# Patient Record
Sex: FEMALE | Race: WHITE | Employment: OTHER | URBAN - METROPOLITAN AREA
[De-identification: names, ages, dates, MRNs, and addresses within clinical notes are randomized per-mention and may not be internally consistent; named-entity substitution may affect disease eponyms.]

---

## 2017-11-06 PROBLEM — E11.9 CONTROLLED TYPE 2 DIABETES MELLITUS WITHOUT COMPLICATION, WITHOUT LONG-TERM CURRENT USE OF INSULIN (HCC): Status: ACTIVE | Noted: 2017-11-06

## 2017-11-06 PROCEDURE — 82043 UR ALBUMIN QUANTITATIVE: CPT | Performed by: FAMILY MEDICINE

## 2017-11-06 PROCEDURE — 82570 ASSAY OF URINE CREATININE: CPT | Performed by: FAMILY MEDICINE

## 2017-11-06 PROCEDURE — 36415 COLL VENOUS BLD VENIPUNCTURE: CPT | Performed by: FAMILY MEDICINE

## 2019-01-03 PROCEDURE — 82043 UR ALBUMIN QUANTITATIVE: CPT | Performed by: FAMILY MEDICINE

## 2019-01-03 PROCEDURE — 82570 ASSAY OF URINE CREATININE: CPT | Performed by: FAMILY MEDICINE

## 2020-10-26 PROBLEM — C96.A: Status: ACTIVE | Noted: 2017-06-06

## 2020-10-26 PROBLEM — M85.80 OSTEOPENIA DETERMINED BY X-RAY: Status: ACTIVE | Noted: 2020-10-26

## 2021-04-11 DIAGNOSIS — Z23 NEED FOR VACCINATION: ICD-10-CM

## 2021-08-18 ENCOUNTER — HOSPITAL ENCOUNTER (OUTPATIENT)
Age: 71
Discharge: HOME | End: 2021-08-18
Attending: EMERGENCY MEDICINE
Payer: MEDICARE

## 2021-08-18 ENCOUNTER — APPOINTMENT (OUTPATIENT)
Dept: GENERAL RADIOLOGY | Age: 71
End: 2021-08-18
Attending: EMERGENCY MEDICINE
Payer: MEDICARE

## 2021-08-18 VITALS
SYSTOLIC BLOOD PRESSURE: 141 MMHG | HEART RATE: 81 BPM | DIASTOLIC BLOOD PRESSURE: 78 MMHG | TEMPERATURE: 98 F | OXYGEN SATURATION: 95 % | RESPIRATION RATE: 18 BRPM

## 2021-08-18 DIAGNOSIS — L03.011 CELLULITIS OF RIGHT MIDDLE FINGER: Primary | ICD-10-CM

## 2021-08-18 PROCEDURE — 99203 OFFICE O/P NEW LOW 30 MIN: CPT

## 2021-08-18 PROCEDURE — 73140 X-RAY EXAM OF FINGER(S): CPT | Performed by: EMERGENCY MEDICINE

## 2021-08-18 RX ORDER — CEPHALEXIN 500 MG/1
500 CAPSULE ORAL 3 TIMES DAILY
Qty: 30 CAPSULE | Refills: 0 | Status: SHIPPED | OUTPATIENT
Start: 2021-08-18 | End: 2021-08-28

## 2021-08-18 NOTE — ED INITIAL ASSESSMENT (HPI)
PATIENT ARRIVED AMBULATORY TO ROOM C/O REDNESS TO THE THIRD DIGIT ON THE RIGHT HAND. PATIENT STATES SHE NOTICED A SMALL CUT 3 DAYS AGO. INCREASING REDNESS. SMALL AMOUNT OF DRAINAGE TODAY.  NO FEVERS

## 2021-08-18 NOTE — ED PROVIDER NOTES
Patient Seen in: [facility]      History   Patient presents with:  Finger Pain    Stated Complaint: finger problem    HPI/Subjective:   HPI    The patient is a 56-year-old female with past history of hyperlipidemia, type 2 diabetes who presen No             Review of Systems    Positive for stated complaint: finger problem  Other systems are as noted in HPI. Constitutional and vital signs reviewed. All other systems reviewed and negative except as noted above.     Physical Exam     ED Tria Prescribed:  Current Discharge Medication List    START taking these medications    cephalexin 500 MG Oral Cap  Take 1 capsule (500 mg total) by mouth 3 (three) times daily for 10 days.   Qty: 30 capsule Refills: 0

## 2022-03-10 PROBLEM — J44.1 CHRONIC OBSTRUCTIVE PULMONARY DISEASE WITH ACUTE EXACERBATION (HCC): Status: ACTIVE | Noted: 2022-03-10

## 2023-06-17 ENCOUNTER — APPOINTMENT (OUTPATIENT)
Dept: GENERAL RADIOLOGY | Age: 73
End: 2023-06-17
Attending: PHYSICIAN ASSISTANT
Payer: MEDICARE

## 2023-06-17 ENCOUNTER — HOSPITAL ENCOUNTER (OUTPATIENT)
Age: 73
Discharge: HOME | End: 2023-06-17
Payer: MEDICARE

## 2023-06-17 VITALS
DIASTOLIC BLOOD PRESSURE: 84 MMHG | SYSTOLIC BLOOD PRESSURE: 130 MMHG | OXYGEN SATURATION: 94 % | TEMPERATURE: 98 F | HEART RATE: 96 BPM | RESPIRATION RATE: 18 BRPM

## 2023-06-17 DIAGNOSIS — S92.902A CLOSED FRACTURE OF LEFT FOOT, INITIAL ENCOUNTER: Primary | ICD-10-CM

## 2023-06-17 PROCEDURE — 28470 CLTX METATARSAL FX WO MNP EA: CPT

## 2023-06-17 PROCEDURE — 73630 X-RAY EXAM OF FOOT: CPT | Performed by: PHYSICIAN ASSISTANT

## 2023-06-17 PROCEDURE — 99213 OFFICE O/P EST LOW 20 MIN: CPT

## 2023-06-17 PROCEDURE — 99214 OFFICE O/P EST MOD 30 MIN: CPT

## 2024-02-01 ENCOUNTER — OFFICE VISIT (OUTPATIENT)
Dept: OTOLARYNGOLOGY | Facility: CLINIC | Age: 74
End: 2024-02-01
Payer: MEDICARE

## 2024-02-01 VITALS — BODY MASS INDEX: 23.22 KG/M2 | WEIGHT: 136 LBS | HEIGHT: 64 IN

## 2024-02-01 DIAGNOSIS — H61.21 IMPACTED CERUMEN OF RIGHT EAR: Primary | ICD-10-CM

## 2024-02-01 DIAGNOSIS — H91.93 BILATERAL HEARING LOSS, UNSPECIFIED HEARING LOSS TYPE: ICD-10-CM

## 2024-02-01 RX ORDER — NEOMYCIN SULFATE, POLYMYXIN B SULFATE AND HYDROCORTISONE 10; 3.5; 1 MG/ML; MG/ML; [USP'U]/ML
3 SUSPENSION/ DROPS AURICULAR (OTIC) 3 TIMES DAILY
Qty: 10 ML | Refills: 0 | Status: SHIPPED | OUTPATIENT
Start: 2024-02-01

## 2024-02-02 NOTE — PROGRESS NOTES
Sheba Rice is a 73 year old female.    Chief Complaint   Patient presents with    Ear Problem     Both ears clogged for multiple weeks  Pt reports feeling like she's under water       HISTORY OF PRESENT ILLNESS  She presents with a several week history of feeling that her ears are clogged bilaterally.  She feels like she is underwater.  Denies any recent cold or flulike symptoms.  Denies any congestive issues intranasally.  No other signs, symptoms or complaints      Social History     Socioeconomic History    Marital status:    Tobacco Use    Smoking status: Former     Packs/day: 1.00     Years: 40.00     Additional pack years: 0.00     Total pack years: 40.00     Types: Cigarettes     Quit date: 2006     Years since quittin.0    Smokeless tobacco: Never    Tobacco comments:     relapsed 1 ppd--    Substance and Sexual Activity    Alcohol use: No    Drug use: No       Family History   Problem Relation Age of Onset    Heart Disorder Father         MI    Lipids Sister     Other (COPD) Sister     Diabetes Brother         pre-dm    Other (ETOH ABUSE) Brother     Lipids Sister     Other (RA) Sister     Heart Disorder Mother         a fib  -         Past Medical History:   Diagnosis Date    CANCER     R fallopian tube Ca--chemo/rad, on trial med postarted  for stable disease-- LN + L clavicle    Esophageal reflux     Leg cramps, sleep related     Other and unspecified hyperlipidemia     SBO (small bowel obstruction) (HCC) 3/12    no surgery-- improved after barium    Thyroid nodule     right    Type II or unspecified type diabetes mellitus without mention of complication, not stated as uncontrolled        Past Surgical History:   Procedure Laterality Date    BACK SURGERY      microdisckectomy--c foot drop    COLONOSCOPY      + polyps-repeat 5 yrs    D & C      HYSTERECTOMY  08    TAHBSO for fallopian ca    HYSTERECTOMY  08    staging c lymph node dissection     OTHER SURGICAL HISTORY  1996    CTS release    OTHER SURGICAL HISTORY  1990    ORIF fx L hand fx--removal '91    OTHER SURGICAL HISTORY  3/16    neck dissection for mets to neck LN of fallopian tube ca    TONSILLECTOMY           REVIEW OF SYSTEMS    System Neg/Pos Details   Constitutional Negative Fatigue, fever and weight loss.   ENMT Negative Drooling.   Eyes Negative Blurred vision and vision changes.   Respiratory Negative Dyspnea and wheezing.   Cardio Negative Chest pain, irregular heartbeat/palpitations and syncope.   GI Negative Abdominal pain and diarrhea.   Endocrine Negative Cold intolerance and heat intolerance.   Neuro Negative Tremors.   Psych Negative Anxiety and depression.   Integumentary Negative Frequent skin infections, pigment change and rash.   Hema/Lymph Negative Easy bleeding and easy bruising.           PHYSICAL EXAM    Ht 5' 4\" (1.626 m)   Wt 136 lb (61.7 kg)   BMI 23.34 kg/m²        Constitutional Normal Overall appearance - Normal.   Psychiatric Normal Orientation - Oriented to time, place, person & situation. Appropriate mood and affect.   Neck Exam Normal Inspection - Normal. Palpation - Normal. Parotid gland - Normal. Thyroid gland - Normal.   Eyes Normal Conjunctiva - Right: Normal, Left: Normal. Pupil - Right: Normal, Left: Normal. Fundus - Right: Normal, Left: Normal.   Neurological Normal Memory - Normal. Cranial nerves - Cranial nerves II through XII grossly intact.   Head/Face Normal Facial features - Normal. Eyebrows - Normal. Skull - Normal.        Nasopharynx Normal External nose - Normal. Lips/teeth/gums - Normal. Tonsils - Normal. Oropharynx - Normal.   Ears Normal Inspection - Right: Normal, Left: Normal. Canal - Right: Normal, Left: Normal. TM - Right: Normal, Left: Normal.  Wax impaction on the right cleared 80%.   Skin Normal Inspection - Normal.        Lymph Detail Normal Submental. Submandibular. Anterior cervical. Posterior cervical. Supraclavicular.         Nose/Mouth/Throat Normal External nose - Normal. Lips/teeth/gums - Normal. Tonsils - Normal. Oropharynx - Normal.   Nose/Mouth/Throat Normal Nares - Right: Normal Left: Normal. Septum -Normal  Turbinates - Right: Normal, Left: Normal.   Microscopy  Binocular microscopy was performed. The affected ear(s) was/were examined and all debris removed using suction. The findings are described in the physical exam.   Right ear cleaned of roughly 80% of cerumen impaction.  Still with tympanic membrane wax.  Very uncomfortable attempting to remove it.  Left ear completely normal.    Current Outpatient Medications:     neomycin-polymyxin-hydrocortisone 3.5-19266-2 Otic Suspension, Place 3 drops into the right ear 3 (three) times daily., Disp: 10 mL, Rfl: 0    LEVOTHYROXINE 50 MCG Oral Tab, TAKE 1/2 TABLET(25 MCG) BY MOUTH BEFORE BREAKFAST, Disp: 45 tablet, Rfl: 0    Tiotropium Bromide Monohydrate 18 MCG Inhalation Cap, Inhale 1 capsule (18 mcg total) into the lungs daily., Disp: , Rfl: 0    ROSUVASTATIN 20 MG Oral Tab, TAKE 1 TABLET(20 MG) BY MOUTH DAILY, Disp: 90 tablet, Rfl: 0    JANUVIA 100 MG Oral Tab, TAKE 1 TABLET(100 MG) BY MOUTH DAILY, Disp: 90 tablet, Rfl: 0    Albuterol Sulfate  (90 Base) MCG/ACT Inhalation Aero Soln, Inhale 2 puffs into the lungs every 6 (six) hours as needed for Wheezing or Shortness of Breath., Disp: 18 g, Rfl: 0    famoTIDine 20 MG Oral Tab, Take 1 tablet (20 mg total) by mouth 2 (two) times daily., Disp: 180 tablet, Rfl: 3    PARoxetine HCl 10 MG Oral Tab, Take 1 tablet (10 mg total) by mouth every morning., Disp: 90 tablet, Rfl: 3    PARoxetine HCl 40 MG Oral Tab, Take 1 tablet (40 mg total) by mouth daily., Disp: 90 tablet, Rfl: 3    VITAMIN D 1000 UNIT OR CAPS, 1 TWICE DAILY, Disp: , Rfl:   ASSESSMENT AND PLAN    1. Impacted cerumen of right ear  Impacted cerumen of the right ear.  I was able to remove about 80% of the wax and there is a portion sitting on the eardrum that could not  remove due to discomfort.  Start Cortisporin eardrops return to see me in 1 week for reevaluation and subsequent to ear cleaning will require a hearing test.  I did reassure her that her left ear looks completely normal at this time.        This note was prepared using [employer] voice recognition dictation software. As a result errors may occur. When identified these errors have been corrected. While every attempt is made to correct errors during dictation discrepancies may still exist    Jorge A Palmer MD    2/1/2024    6:17 PM

## 2024-02-08 ENCOUNTER — OFFICE VISIT (OUTPATIENT)
Dept: OTOLARYNGOLOGY | Facility: CLINIC | Age: 74
End: 2024-02-08

## 2024-02-08 DIAGNOSIS — H61.21 IMPACTED CERUMEN OF RIGHT EAR: Primary | ICD-10-CM

## 2024-02-08 PROCEDURE — 69210 REMOVE IMPACTED EAR WAX UNI: CPT | Performed by: OTOLARYNGOLOGY

## 2024-02-08 NOTE — PROGRESS NOTES
Sheba Rice is a 73 year old female.    Chief Complaint   Patient presents with    Follow - Up     Ear cleaning        HISTORY OF PRESENT ILLNESS    Patient presents for cerumen removal. No other complaints or concerns at this time    Social History     Socioeconomic History    Marital status:    Tobacco Use    Smoking status: Former     Packs/day: 1.00     Years: 40.00     Additional pack years: 0.00     Total pack years: 40.00     Types: Cigarettes     Quit date: 2006     Years since quittin.0    Smokeless tobacco: Never    Tobacco comments:     relapsed 1 ppd--    Substance and Sexual Activity    Alcohol use: No    Drug use: No       Family History   Problem Relation Age of Onset    Heart Disorder Father         MI    Lipids Sister     Other (COPD) Sister     Diabetes Brother         pre-dm    Other (ETOH ABUSE) Brother     Lipids Sister     Other (RA) Sister     Heart Disorder Mother         a fib  -         Past Medical History:   Diagnosis Date    CANCER     R fallopian tube Ca--chemo/rad, on trial med postarted  for stable disease-- LN + L clavicle    Esophageal reflux     Leg cramps, sleep related     Other and unspecified hyperlipidemia     SBO (small bowel obstruction) (HCC) 3/12    no surgery-- improved after barium    Thyroid nodule     right    Type II or unspecified type diabetes mellitus without mention of complication, not stated as uncontrolled        Past Surgical History:   Procedure Laterality Date    BACK SURGERY      microdisckectomy--c foot drop    COLONOSCOPY      + polyps-repeat 5 yrs    D & C      HYSTERECTOMY  08    TAHBSO for fallopian ca    HYSTERECTOMY  08    staging c lymph node dissection    OTHER SURGICAL HISTORY      CTS release    OTHER SURGICAL HISTORY      ORIF fx L hand fx--removal     OTHER SURGICAL HISTORY  3/16    neck dissection for mets to neck LN of fallopian tube ca    TONSILLECTOMY         REVIEW OF  SYSTEMS    System Neg/Pos Details   Constitutional Negative Fatigue, fever and weight loss.   ENMT Negative Drooling.   Eyes Negative Blurred vision and vision changes.   Respiratory Negative Dyspnea and wheezing.   Cardio Negative Chest pain, irregular heartbeat/palpitations and syncope.   GI Negative Abdominal pain and diarrhea.   Endocrine Negative Cold intolerance and heat intolerance.   Neuro Negative Tremors.   Psych Negative Anxiety and depression.   Integumentary Negative Frequent skin infections, pigment change and rash.   Hema/Lymph Negative Easy bleeding and easy bruising.           PHYSICAL EXAM    There were no vitals taken for this visit.       Constitutional Normal Overall appearance - Normal.        Neck Exam Normal Inspection - Normal. Palpation - Normal. Parotid gland - Normal. Thyroid gland - Normal.             Head/Face Normal Facial features - Normal. Eyebrows - Normal. Skull - Normal.             Ears Normal Inspection - Right: Normal, Left: Normal. Canal - Right: Normal, Left: Normal. TM - Right: Normal, Left: Normal.   Skin Normal Inspection - Normal.                              Canals:  Right: Canal reveals cerumen impaction,   Left: Canal reveals no cerumen impaction,     Tympanic Membranes:  Right: Normal tympanic membrane.   Left: Normal tympanic membrane.     TM Visualized Method:   Right TM examined via otomicroscopy.    Left TM examined via otomicroscopy.      PROCEDURE:    Removal of cerumen impaction   The cerumen impaction was completely removed using microscopy.   Removal was completed by using acurette and/or suction.   Comments: Return to clinic as needed.  Avoid q-tips, water precautions and use over the counter wax remedies as needed.      Current Outpatient Medications:     neomycin-polymyxin-hydrocortisone 3.5-44731-3 Otic Suspension, Place 3 drops into the right ear 3 (three) times daily., Disp: 10 mL, Rfl: 0    LEVOTHYROXINE 50 MCG Oral Tab, TAKE 1/2 TABLET(25 MCG) BY  MOUTH BEFORE BREAKFAST, Disp: 45 tablet, Rfl: 0    Tiotropium Bromide Monohydrate 18 MCG Inhalation Cap, Inhale 1 capsule (18 mcg total) into the lungs daily., Disp: , Rfl: 0    ROSUVASTATIN 20 MG Oral Tab, TAKE 1 TABLET(20 MG) BY MOUTH DAILY, Disp: 90 tablet, Rfl: 0    JANUVIA 100 MG Oral Tab, TAKE 1 TABLET(100 MG) BY MOUTH DAILY, Disp: 90 tablet, Rfl: 0    Albuterol Sulfate  (90 Base) MCG/ACT Inhalation Aero Soln, Inhale 2 puffs into the lungs every 6 (six) hours as needed for Wheezing or Shortness of Breath., Disp: 18 g, Rfl: 0    famoTIDine 20 MG Oral Tab, Take 1 tablet (20 mg total) by mouth 2 (two) times daily., Disp: 180 tablet, Rfl: 3    PARoxetine HCl 10 MG Oral Tab, Take 1 tablet (10 mg total) by mouth every morning., Disp: 90 tablet, Rfl: 3    PARoxetine HCl 40 MG Oral Tab, Take 1 tablet (40 mg total) by mouth daily., Disp: 90 tablet, Rfl: 3    VITAMIN D 1000 UNIT OR CAPS, 1 TWICE DAILY, Disp: , Rfl:   ASSESSMENT AND PLAN    1. Impacted cerumen of right ear        All cerumen was removed using microscopy. I have asked the patient to return to see me as needed for repeat cerumen removal in the future.      Jorge A Palmer MD    2/8/2024    11:58 AM

## 2024-05-13 ENCOUNTER — APPOINTMENT (OUTPATIENT)
Dept: GENERAL RADIOLOGY | Age: 74
End: 2024-05-13
Attending: NURSE PRACTITIONER

## 2024-05-13 ENCOUNTER — HOSPITAL ENCOUNTER (OUTPATIENT)
Age: 74
Discharge: HOME | End: 2024-05-13

## 2024-05-13 VITALS
DIASTOLIC BLOOD PRESSURE: 74 MMHG | OXYGEN SATURATION: 95 % | SYSTOLIC BLOOD PRESSURE: 126 MMHG | TEMPERATURE: 98 F | RESPIRATION RATE: 18 BRPM | HEART RATE: 87 BPM

## 2024-05-13 DIAGNOSIS — S99.912A INJURY OF LEFT ANKLE, INITIAL ENCOUNTER: Primary | ICD-10-CM

## 2024-05-13 DIAGNOSIS — S93.402A SPRAIN OF LEFT ANKLE, UNSPECIFIED LIGAMENT, INITIAL ENCOUNTER: ICD-10-CM

## 2024-05-13 PROCEDURE — 73610 X-RAY EXAM OF ANKLE: CPT | Performed by: NURSE PRACTITIONER

## 2024-05-13 PROCEDURE — L4350 ANKLE CONTROL ORTHO PRE OTS: HCPCS | Performed by: NURSE PRACTITIONER

## 2024-05-13 PROCEDURE — 99213 OFFICE O/P EST LOW 20 MIN: CPT | Performed by: NURSE PRACTITIONER

## 2024-05-13 PROCEDURE — A6449 LT COMPRES BAND >=3" <5"/YD: HCPCS | Performed by: NURSE PRACTITIONER

## 2024-05-13 PROCEDURE — 73630 X-RAY EXAM OF FOOT: CPT | Performed by: NURSE PRACTITIONER

## 2024-05-13 NOTE — DISCHARGE INSTRUCTIONS
No fracture seen on the x-rays.  Take Tylenol as needed for pain.  Wear the splint for about 1 week. Use the cane. Ice.  Elevate.  Follow-up with your primary doctor if no improvement.

## 2024-05-13 NOTE — ED PROVIDER NOTES
Patient Seen in: [facility]      History     Chief Complaint   Patient presents with    Ankle Injury     Stated Complaint: Left foot/ankle injury    Subjective:   74-year-old female with prediabetes and chronic left foot drop presents from home with a left ankle injury.  States she slipped down the stairs just prior to arrival.  Fell down 2 steps and rolled her left ankle.  Denies hitting head.  Complaining of left ankle and foot pain.  Increased with bearing weight.  Chronic numbness and foot drop.  No open wounds.  No pain medications taken at home.    The history is provided by the patient. No  was used.       Objective:   No pertinent past medical history.        HISTORY:  Past Medical History:    CANCER    R fallopian tube Ca--chemo/rad, on trial med postarted ' 14 for stable disease-- LN + L clavicle    Esophageal reflux    Leg cramps, sleep related    Other and unspecified hyperlipidemia    SBO (small bowel obstruction) (HCC)    no surgery-- improved after barium    Thyroid nodule    right    Type II or unspecified type diabetes mellitus without mention of complication, not stated as uncontrolled      Past Surgical History:   Procedure Laterality Date    Back surgery  2001    microdisckectomy--c foot drop    Colonoscopy  6/09    + polyps-repeat 5 yrs    D & c      Hysterectomy  2/14/08    TAHBSO for fallopian ca    Hysterectomy  2/28/08    staging c lymph node dissection    Other surgical history  1996    CTS release    Other surgical history  1990    ORIF fx L hand fx--removal '91    Other surgical history  3/16    neck dissection for mets to neck LN of fallopian tube ca    Tonsillectomy        Family History   Problem Relation Age of Onset    Heart Disorder Father         MI    Lipids Sister     Other (COPD) Sister     Diabetes Brother         pre-dm    Other (ETOH ABUSE) Brother     Lipids Sister     Other (RA) Sister     Heart Disorder Mother         a fib  -         Social History     Socioeconomic History    Marital status:    Tobacco Use    Smoking status: Former     Current packs/day: 0.00     Average packs/day: 1 pack/day for 40.0 years (40.0 ttl pk-yrs)     Types: Cigarettes     Start date: 1966     Quit date: 2006     Years since quittin.2    Smokeless tobacco: Never    Tobacco comments:     relapsed 1 ppd-- ' 13   Substance and Sexual Activity    Alcohol use: No    Drug use: No            No pertinent past surgical history.              No pertinent social history.            Review of Systems    Positive for stated complaint: Left foot/ankle injury  Other systems are as noted in HPI.  Constitutional and vital signs reviewed.      All other systems reviewed and negative except as noted above.    Physical Exam     ED Triage Vitals [24 1129]   /74   Pulse 87   Resp 18   Temp 97.9 °F (36.6 °C)   Temp src Temporal   SpO2 95 %   O2 Device None (Room air)       Current Vitals:   Vital Signs  BP: 126/74  Pulse: 87  Resp: 18  Temp: 97.9 °F (36.6 °C)  Temp src: Temporal    Oxygen Therapy  SpO2: 95 %  O2 Device: None (Room air)            Physical Exam  Vitals and nursing note reviewed.   Constitutional:       General: She is not in acute distress.     Appearance: Normal appearance. She is not ill-appearing or toxic-appearing.   HENT:      Head: Normocephalic and atraumatic.      Nose: Nose normal.      Mouth/Throat:      Mouth: Mucous membranes are moist.      Pharynx: Oropharynx is clear.   Eyes:      Pupils: Pupils are equal, round, and reactive to light.   Cardiovascular:      Rate and Rhythm: Normal rate and regular rhythm.      Pulses: Normal pulses.   Pulmonary:      Effort: Pulmonary effort is normal. No respiratory distress.      Breath sounds: Normal breath sounds.      Comments: Lungs clear.  No adventitious lung sounds.  No distress.  No hypoxia.  Pulse ox 95% ra. Which is normal    Abdominal:      General: Abdomen is flat.       Palpations: Abdomen is soft.   Musculoskeletal:         General: No signs of injury. Normal range of motion.      Cervical back: Normal range of motion and neck supple.      Left ankle: Swelling present. No deformity or ecchymosis. Tenderness present over the lateral malleolus and base of 5th metatarsal. Normal pulse.      Comments: Chronic left foot drop   Skin:     General: Skin is warm and dry.      Capillary Refill: Capillary refill takes less than 2 seconds.   Neurological:      General: No focal deficit present.      Mental Status: She is alert and oriented to person, place, and time.      GCS: GCS eye subscore is 4. GCS verbal subscore is 5. GCS motor subscore is 6.   Psychiatric:         Mood and Affect: Mood normal.         Behavior: Behavior normal.         Thought Content: Thought content normal.         Judgment: Judgment normal.           ED Course   Labs Reviewed - No data to display   XR FOOT, COMPLETE (MIN 3 VIEWS), LEFT (CPT=73630)    Result Date: 5/13/2024  CONCLUSION:  1. No acute appearing fracture or dislocation.  Degenerative narrowing as described above.  Mild dorsal soft tissue swelling overlying the midfoot.  Correlate clinically.    Dictated by (CST): Edin Chamberlain MD on 5/13/2024 at 11:59 AM     Finalized by (CST): Edin Chamberlain MD on 5/13/2024 at 12:01 PM          XR ANKLE (MIN 3 VIEWS), LEFT (CPT=73610)    Result Date: 5/13/2024  CONCLUSION:  1. No acute appearing fracture or dislocation.  Moderate lateral malleolar soft tissue swelling.  See above.    Dictated by (CST): Edin Chamberlain MD on 5/13/2024 at 11:57 AM     Finalized by (CST): Edin Chamberlain MD on 5/13/2024 at 11:58 AM             [facility]        Medical Decision Making  Differential diagnosis: Fracture versus sprain  X-rays of the left foot and left ankle are both negative for fracture or dislocation.  Chronic changes were discussed with the patient.  Ace wrap applied for comfort.  CMS intact.  Patient declined stirrup splint.   She does note that she has a walking boot and a cane at home that she will use.  Patient declined pain medication  Plan of care: Tylenol, ice, elevate, cam boot, cane.  Follow-up with primary doctor if no improvement  Results and plan of care discussed with the patient/family. They are in agreement with discharge. They understand to follow up with their primary doctor or the referral physician for further evaluation, especially if no improvement.  Also discussed the limitations of immediate care, patient is aware that if symptoms are worse they should go to the emergency room. Verbal and written discharge instructions were given.       Problems Addressed:  Injury of left ankle, initial encounter: acute illness or injury  Sprain of left ankle, unspecified ligament, initial encounter: acute illness or injury    Amount and/or Complexity of Data Reviewed  Radiology: ordered and independent interpretation performed. Decision-making details documented in ED Course.     Details: No fracture    Risk  OTC drugs.        Disposition and Plan     Clinical Impression:  1. Injury of left ankle, initial encounter    2. Sprain of left ankle, unspecified ligament, initial encounter         Disposition:  Discharge  5/13/2024 12:09 pm    Follow-up:  Izabella Call MD  [address] [place] [address]  [phone]                Medications Prescribed:  Current Discharge Medication List

## 2025-04-18 ENCOUNTER — HOSPITAL ENCOUNTER (OUTPATIENT)
Age: 75
Discharge: HOME | End: 2025-04-18
Payer: MEDICARE

## 2025-04-18 VITALS
SYSTOLIC BLOOD PRESSURE: 111 MMHG | DIASTOLIC BLOOD PRESSURE: 59 MMHG | OXYGEN SATURATION: 92 % | TEMPERATURE: 98 F | RESPIRATION RATE: 18 BRPM | HEART RATE: 79 BPM

## 2025-04-18 DIAGNOSIS — L03.032 CELLULITIS OF LEFT TOE: ICD-10-CM

## 2025-04-18 DIAGNOSIS — L03.031 PARONYCHIA OF GREAT TOE OF RIGHT FOOT: Primary | ICD-10-CM

## 2025-04-18 PROCEDURE — 10060 I&D ABSCESS SIMPLE/SINGLE: CPT

## 2025-04-18 PROCEDURE — 99214 OFFICE O/P EST MOD 30 MIN: CPT

## 2025-04-18 PROCEDURE — 99213 OFFICE O/P EST LOW 20 MIN: CPT

## 2025-04-18 RX ORDER — SULFAMETHOXAZOLE AND TRIMETHOPRIM 800; 160 MG/1; MG/1
1 TABLET ORAL 2 TIMES DAILY
Qty: 14 TABLET | Refills: 0 | Status: SHIPPED | OUTPATIENT
Start: 2025-04-18 | End: 2025-04-18

## 2025-04-18 NOTE — ED PROVIDER NOTES
Patient Seen in: [facility] [place]      History     Chief Complaint   Patient presents with    Toe Pain     Stated Complaint: right big toenail    Subjective:   HPI    Patient is a 75-year-old female with history below, presenting to immediate care for evaluation of right great toe infection.  Onset: 2 days.  Initial tenderness right great toe.  Since has developed associated redness, swelling, and visible yellow pus underneath skin next to right lateral great toenail.  No drainage concern for infection.  She denies any fever or systemic symptoms.  She denies prior episodes.  No numbness weakness paresthesias.  Tender to palpation.  Relief with rest.  Clean with hydrogen peroxide.  No trauma.  No recent pedicure      History of Present Illness               Objective:     Past Medical History:    CANCER    R fallopian tube Ca--chemo/rad, on trial med postarted ' 14 for stable disease-- LN + L clavicle    Esophageal reflux    Leg cramps, sleep related    Other and unspecified hyperlipidemia    SBO (small bowel obstruction) (HCC)    no surgery-- improved after barium    Thyroid nodule    right    Type II or unspecified type diabetes mellitus without mention of complication, not stated as uncontrolled              Past Surgical History:   Procedure Laterality Date    Back surgery  2001    microdisckectomy--c foot drop    Colonoscopy  6/09    + polyps-repeat 5 yrs    D & c      Hysterectomy  2/14/08    TAHBSO for fallopian ca    Hysterectomy  2/28/08    staging c lymph node dissection    Other surgical history  1996    CTS release    Other surgical history  1990    ORIF fx L hand fx--removal '91    Other surgical history  3/16    neck dissection for mets to neck LN of fallopian tube ca    Tonsillectomy                  Social History     Socioeconomic History    Marital status:    Tobacco Use    Smoking status: Former     Current packs/day: 0.00     Average packs/day: 1 pack/day for 40.0 years (40.0 ttl  pk-yrs)     Types: Cigarettes     Start date: 1966     Quit date: 2006     Years since quittin.2    Smokeless tobacco: Never    Tobacco comments:     relapsed 1 ppd-- ' 13   Vaping Use    Vaping status: Never Used   Substance and Sexual Activity    Alcohol use: No    Drug use: No              Review of Systems   Constitutional:  Negative for chills and fever.   Musculoskeletal:  Positive for joint swelling.        Right great toe pain and swelling   Skin:  Positive for color change.   Allergic/Immunologic: Negative for immunocompromised state.   Neurological:  Negative for weakness and numbness.   Psychiatric/Behavioral:  Negative for confusion.        Positive for stated complaint: right big toenail  Other systems are as noted in HPI.  Constitutional and vital signs reviewed.      All other systems reviewed and negative except as noted above.                  Physical Exam     ED Triage Vitals [25 1417]   /59   Pulse 79   Resp 18   Temp 98.1 °F (36.7 °C)   Temp src Oral   SpO2 92 %   O2 Device None (Room air)       Current Vitals:   Vital Signs  BP: 111/59  Pulse: 79  Resp: 18  Temp: 98.1 °F (36.7 °C)  Temp src: Oral    Oxygen Therapy  SpO2: 92 %  O2 Device: None (Room air)        Physical Exam  Vitals and nursing note reviewed.   Constitutional:       General: She is not in acute distress.     Appearance: Normal appearance. She is not ill-appearing, toxic-appearing or diaphoretic.   HENT:      Mouth/Throat:      Mouth: Mucous membranes are moist.   Musculoskeletal:         General: Swelling and tenderness present.      Comments: Paronychia of right great toenail.  There is yellow area of induration and fluctuance without drainage dorsum of lateral right great toe nail.  There is surrounding erythema and warmth.   Skin:     Findings: Erythema present.   Neurological:      Mental Status: She is alert and oriented to person, place, and time.      Motor: No weakness.      Gait: Gait normal.          Physical Exam                ED Course   Labs Reviewed - No data to display       Results         Incision and Drainage - Paronychia  Site: Right Great Toe  Wound cleansed: Betadine  Analgesia: None  Incision: Straight, Stab  Instrument: #11 Blade Scapel  Drainage: Scant, Purulent 1.5 cc  No packing  Wound care provided by irrigation, topica abx, non-adherent dressing  Wound care instructions provided in Discharge Papers          MDM     Dx: Paronychia of right great toe, Initial Encounter  Dx: Cellulitis of Right Great Toe, Initial Encounter  Inflammation/infection of the proximal nail folds  No systemic symptoms  Not immunocompromised  Clinical diagnosis  Incision Drainage Performed  Outpatient Management  Warm compresses, soaks, elevation  Topical and oral antibiotics paronychia with cellulitis   Wound care instructions provided   Discharge instructions on Paronychia  PCP follow-up      Medical Decision Making      Disposition and Plan     Clinical Impression:  1. Paronychia of great toe of right foot    2. Cellulitis of left toe         Disposition:  Discharge  4/18/2025  2:37 pm    Follow-up:  Ana Ramirez DPM  [address]  [facility] [id number]  [phone]      Podiatrist (Foot & Ankle Doctor), As needed    Brenna Moon DPM  [address]  [place] [address]  [phone]      Podiatry (Foot and Ankle Doctor), As needed          Medications Prescribed:  Current Discharge Medication List        START taking these medications    Details   amoxicillin clavulanate 875-125 MG Oral Tab Take 1 tablet by mouth 2 (two) times daily for 7 days.  Qty: 14 tablet, Refills: 0             Supplementary Documentation: